# Patient Record
Sex: MALE | Race: WHITE | NOT HISPANIC OR LATINO | URBAN - METROPOLITAN AREA
[De-identification: names, ages, dates, MRNs, and addresses within clinical notes are randomized per-mention and may not be internally consistent; named-entity substitution may affect disease eponyms.]

---

## 2020-07-25 ENCOUNTER — TELEPHONE ENCOUNTER (OUTPATIENT)
Dept: URBAN - METROPOLITAN AREA CLINIC 13 | Facility: CLINIC | Age: 70
End: 2020-07-25

## 2020-07-25 RX ORDER — ATORVASTATIN CALCIUM 40 MG/1
TABLET, FILM COATED ORAL
Qty: 90 | Refills: 0 | OUTPATIENT
Start: 2012-01-27 | End: 2013-01-10

## 2020-07-26 ENCOUNTER — TELEPHONE ENCOUNTER (OUTPATIENT)
Dept: URBAN - METROPOLITAN AREA CLINIC 13 | Facility: CLINIC | Age: 70
End: 2020-07-26

## 2020-07-26 RX ORDER — CITALOPRAM HYDROBROMIDE 10 MG/1
TAKE ONE TABLET BY MOUTH EVERY DAY TABLET, FILM COATED ORAL
Qty: 90 | Refills: 0 | Status: ACTIVE | COMMUNITY
Start: 2012-02-20

## 2020-07-26 RX ORDER — ARMODAFINIL 250 MG/1
TABLET ORAL
Qty: 90 | Refills: 0 | Status: ACTIVE | COMMUNITY
Start: 2013-01-08

## 2020-07-26 RX ORDER — MULTIVITAMIN
TAKE 1 CAPSULE DAILY CAPSULE ORAL
Refills: 0 | Status: ACTIVE | COMMUNITY

## 2020-07-26 RX ORDER — AMLODIPINE BESYLATE 10 MG/1
TAKE 1 TABLET DAILY TABLET ORAL
Refills: 0 | Status: ACTIVE | COMMUNITY
Start: 2012-01-27

## 2020-07-26 RX ORDER — CITALOPRAM HYDROBROMIDE 10 MG/1
TABLET, FILM COATED ORAL
Qty: 90 | Refills: 0 | Status: ACTIVE | COMMUNITY
Start: 2011-12-05

## 2020-07-26 RX ORDER — ZOLPIDEM TARTRATE 1.75 MG/1
PLACE 1 MG DAILY TABLET SUBLINGUAL
Refills: 0 | Status: ACTIVE | COMMUNITY
Start: 2012-10-22

## 2020-07-26 RX ORDER — CEPHALEXIN 250 MG/1
CAPSULE ORAL
Qty: 10 | Refills: 0 | Status: ACTIVE | COMMUNITY
Start: 2011-05-10

## 2020-07-26 RX ORDER — HYDROXYZINE PAMOATE 25 MG/1
CAPSULE ORAL
Qty: 30 | Refills: 0 | Status: ACTIVE | COMMUNITY
Start: 2012-02-20

## 2020-07-26 RX ORDER — HYDROXYZINE PAMOATE 25 MG/1
TAKE 1 CAPSULE 4 TIMES DAILY AS NEEDED CAPSULE ORAL
Qty: 360 | Refills: 3 | Status: ACTIVE | COMMUNITY
Start: 2012-11-01

## 2020-07-26 RX ORDER — CLONAZEPAM 1 MG/1
TAKE 1 TABLET 3 TIMES DAILY TABLET ORAL
Refills: 0 | Status: ACTIVE | COMMUNITY
Start: 2012-10-24

## 2021-02-17 ENCOUNTER — OFFICE VISIT (OUTPATIENT)
Dept: URBAN - METROPOLITAN AREA CLINIC 113 | Facility: CLINIC | Age: 71
End: 2021-02-17
Payer: MEDICARE

## 2021-02-17 VITALS
DIASTOLIC BLOOD PRESSURE: 82 MMHG | HEART RATE: 87 BPM | SYSTOLIC BLOOD PRESSURE: 154 MMHG | BODY MASS INDEX: 27.47 KG/M2 | WEIGHT: 175 LBS | TEMPERATURE: 97.7 F | HEIGHT: 67 IN | RESPIRATION RATE: 18 BRPM

## 2021-02-17 DIAGNOSIS — K76.0 FATTY LIVER: ICD-10-CM

## 2021-02-17 DIAGNOSIS — Z12.11 COLON CANCER SCREENING: ICD-10-CM

## 2021-02-17 DIAGNOSIS — R74.8 ELEVATED LIVER ENZYMES: ICD-10-CM

## 2021-02-17 PROBLEM — 305058001: Status: ACTIVE | Noted: 2021-02-17

## 2021-02-17 PROCEDURE — 99214 OFFICE O/P EST MOD 30 MIN: CPT | Performed by: NURSE PRACTITIONER

## 2021-02-17 RX ORDER — SODIUM, POTASSIUM,MAG SULFATES 17.5-3.13G
354 ML SOLUTION, RECONSTITUTED, ORAL ORAL ONCE
Qty: 354 MILLILITER | Refills: 0 | OUTPATIENT

## 2021-02-17 RX ORDER — MELOXICAM 15 MG/1
1 TABLET TABLET ORAL ONCE A DAY
Status: ACTIVE | COMMUNITY

## 2021-02-17 RX ORDER — CEPHALEXIN 250 MG/1
CAPSULE ORAL
Qty: 10 | Refills: 0 | Status: DISCONTINUED | COMMUNITY
Start: 2011-05-10

## 2021-02-17 RX ORDER — LISINOPRIL AND HYDROCHLOROTHIAZIDE 10; 12.5 MG/1; MG/1
1 TABLET TABLET ORAL ONCE A DAY
Status: ACTIVE | COMMUNITY

## 2021-02-17 RX ORDER — ARMODAFINIL 250 MG/1
1 TABLET TABLET ORAL ONCE A DAY
Refills: 0 | Status: DISCONTINUED | COMMUNITY
Start: 2013-01-08

## 2021-02-17 RX ORDER — MULTIVITAMIN
TAKE 1 CAPSULE DAILY CAPSULE ORAL
Refills: 0 | Status: ACTIVE | COMMUNITY

## 2021-02-17 RX ORDER — ZOLPIDEM TARTRATE 12.5 MG/1
1 TABLET AT BEDTIME AS NEEDED TABLET, EXTENDED RELEASE ORAL ONCE A DAY
Status: ACTIVE | COMMUNITY

## 2021-02-17 RX ORDER — ZOLPIDEM TARTRATE 1.75 MG/1
PLACE 1 MG DAILY TABLET SUBLINGUAL
Refills: 0 | Status: DISCONTINUED | COMMUNITY
Start: 2012-10-22

## 2021-02-17 RX ORDER — CLONAZEPAM 1 MG/1
TAKE 1 TABLET 3 TIMES DAILY TABLET ORAL
Refills: 0 | Status: DISCONTINUED | COMMUNITY
Start: 2012-10-24

## 2021-02-17 RX ORDER — HYDROXYZINE PAMOATE 25 MG/1
1 CAPSULE AS NEEDED CAPSULE ORAL
Refills: 0 | Status: DISCONTINUED | COMMUNITY
Start: 2012-02-20

## 2021-02-17 RX ORDER — CITALOPRAM HYDROBROMIDE 10 MG/1
TABLET, FILM COATED ORAL
Qty: 90 | Refills: 0 | Status: DISCONTINUED | COMMUNITY
Start: 2011-12-05

## 2021-02-17 RX ORDER — AMLODIPINE BESYLATE 10 MG/1
TAKE 1 TABLET DAILY TABLET ORAL
Refills: 0 | Status: DISCONTINUED | COMMUNITY
Start: 2012-01-27

## 2021-02-17 RX ORDER — OMEPRAZOLE 40 MG/1
1 CAPSULE 30 MINUTES BEFORE MORNING MEAL CAPSULE, DELAYED RELEASE ORAL ONCE A DAY
Status: ACTIVE | COMMUNITY

## 2021-02-17 NOTE — HPI-TODAY'S VISIT:
This is a 70-year-old male with a history of elevated liver enzymes related to fatty liver and alcohol use presenting after a long interval regarding liver enzyme elevation.  He was last seen in 2013  For follow-up regarding liver enzyme elevation.  A work-up for autoimmune, viral, and hereditary sources of liver disease was negative.  He reported a 40-year history of alcoholism.  He had stopped drinking alcohol in October 2012.  Liver enzymes were normal.  He was instructed to continue to avoid alcohol. He has moved to [place], [place].  He has maintained his relationships with his physicians in [place] with the exception of primary care.  10 months ago, he started drinking alcohol.  Initially, he was drinking 2/day.  This escalated to 6 beverages per day typically consisting of 3 beers and 2 shots of tequila.  He had labs in January that demonstrated liver enzyme elevation and stopped drinking alcohol.  Acid reflux is controlled with omeprazole.  He denies any other abdominal symptoms.  He denies use of herbal preparations.  He is due for screening colonoscopy. His PCP is JUAN M Conte at [facility] in [place], [place] He will obtain labs at [facility] in [place].

## 2021-02-22 ENCOUNTER — LAB OUTSIDE AN ENCOUNTER (OUTPATIENT)
Dept: URBAN - METROPOLITAN AREA CLINIC 113 | Facility: CLINIC | Age: 71
End: 2021-02-22

## 2021-02-26 ENCOUNTER — TELEPHONE ENCOUNTER (OUTPATIENT)
Dept: URBAN - METROPOLITAN AREA SURGERY CENTER 30 | Facility: SURGERY CENTER | Age: 71
End: 2021-02-26

## 2021-02-26 RX ORDER — MELOXICAM 15 MG/1
1 TABLET TABLET ORAL ONCE A DAY
Status: ACTIVE | COMMUNITY

## 2021-02-26 RX ORDER — SODIUM, POTASSIUM,MAG SULFATES 17.5-3.13G
354 ML SOLUTION, RECONSTITUTED, ORAL ORAL ONCE
Qty: 354 MILLILITER | Refills: 0 | Status: ACTIVE | COMMUNITY

## 2021-02-26 RX ORDER — LISINOPRIL AND HYDROCHLOROTHIAZIDE 10; 12.5 MG/1; MG/1
1 TABLET TABLET ORAL ONCE A DAY
Status: ACTIVE | COMMUNITY

## 2021-02-26 RX ORDER — MULTIVITAMIN
TAKE 1 CAPSULE DAILY CAPSULE ORAL
Refills: 0 | Status: ACTIVE | COMMUNITY

## 2021-02-26 RX ORDER — ZOLPIDEM TARTRATE 12.5 MG/1
1 TABLET AT BEDTIME AS NEEDED TABLET, EXTENDED RELEASE ORAL ONCE A DAY
Status: ACTIVE | COMMUNITY

## 2021-02-26 RX ORDER — OMEPRAZOLE 40 MG/1
1 CAPSULE 30 MINUTES BEFORE MORNING MEAL CAPSULE, DELAYED RELEASE ORAL ONCE A DAY
Status: ACTIVE | COMMUNITY

## 2021-02-26 RX ORDER — POLYETHYLENE GLYCOL 3350, SODIUM SULFATE, SODIUM CHLORIDE, POTASSIUM CHLORIDE, ASCORBIC ACID, SODIUM ASCORBATE 140-9-5.2G
AS DIRECTED KIT ORAL ONCE
Qty: 140 GM | Refills: 0 | OUTPATIENT
Start: 2021-02-26 | End: 2021-02-27

## 2021-03-03 ENCOUNTER — TELEPHONE ENCOUNTER (OUTPATIENT)
Dept: URBAN - METROPOLITAN AREA CLINIC 113 | Facility: CLINIC | Age: 71
End: 2021-03-03

## 2021-03-03 RX ORDER — OMEPRAZOLE 40 MG/1
1 CAPSULE 30 MINUTES BEFORE MORNING MEAL CAPSULE, DELAYED RELEASE ORAL ONCE A DAY
Status: ACTIVE | COMMUNITY

## 2021-03-03 RX ORDER — MELOXICAM 15 MG/1
1 TABLET TABLET ORAL ONCE A DAY
Status: ACTIVE | COMMUNITY

## 2021-03-03 RX ORDER — ZOLPIDEM TARTRATE 12.5 MG/1
1 TABLET AT BEDTIME AS NEEDED TABLET, EXTENDED RELEASE ORAL ONCE A DAY
Status: ACTIVE | COMMUNITY

## 2021-03-03 RX ORDER — SODIUM, POTASSIUM,MAG SULFATES 17.5-3.13G
354 ML SOLUTION, RECONSTITUTED, ORAL ORAL ONCE
Qty: 354 MILLILITER | Refills: 0 | Status: ACTIVE | COMMUNITY

## 2021-03-03 RX ORDER — LISINOPRIL AND HYDROCHLOROTHIAZIDE 10; 12.5 MG/1; MG/1
1 TABLET TABLET ORAL ONCE A DAY
Status: ACTIVE | COMMUNITY

## 2021-03-03 RX ORDER — SODIUM PICOSULFATE, MAGNESIUM OXIDE, AND ANHYDROUS CITRIC ACID 10; 3.5; 12 MG/160ML; G/160ML; G/160ML
160ML LIQUID ORAL ONCE
Qty: 160 ML | Refills: 0 | OUTPATIENT
Start: 2021-03-03 | End: 2021-03-04

## 2021-03-03 RX ORDER — MULTIVITAMIN
TAKE 1 CAPSULE DAILY CAPSULE ORAL
Refills: 0 | Status: ACTIVE | COMMUNITY

## 2021-03-09 ENCOUNTER — CLAIMS CREATED FROM THE CLAIM WINDOW (OUTPATIENT)
Dept: URBAN - METROPOLITAN AREA CLINIC 4 | Facility: CLINIC | Age: 71
End: 2021-03-09
Payer: MEDICARE

## 2021-03-09 ENCOUNTER — OFFICE VISIT (OUTPATIENT)
Dept: URBAN - METROPOLITAN AREA SURGERY CENTER 25 | Facility: SURGERY CENTER | Age: 71
End: 2021-03-09
Payer: MEDICARE

## 2021-03-09 DIAGNOSIS — D12.5 ADENOMA OF SIGMOID COLON: ICD-10-CM

## 2021-03-09 DIAGNOSIS — D12.5 BENIGN NEOPLASM OF SIGMOID COLON: ICD-10-CM

## 2021-03-09 DIAGNOSIS — Z12.11 COLON CANCER SCREENING: ICD-10-CM

## 2021-03-09 DIAGNOSIS — D12.2 ADENOMA OF ASCENDING COLON: ICD-10-CM

## 2021-03-09 DIAGNOSIS — D12.2 BENIGN NEOPLASM OF ASCENDING COLON: ICD-10-CM

## 2021-03-09 PROCEDURE — 45385 COLONOSCOPY W/LESION REMOVAL: CPT | Performed by: INTERNAL MEDICINE

## 2021-03-09 PROCEDURE — G8907 PT DOC NO EVENTS ON DISCHARG: HCPCS | Performed by: INTERNAL MEDICINE

## 2021-03-09 PROCEDURE — 88305 TISSUE EXAM BY PATHOLOGIST: CPT | Performed by: PATHOLOGY

## 2021-03-09 RX ORDER — MELOXICAM 15 MG/1
1 TABLET TABLET ORAL ONCE A DAY
Status: ACTIVE | COMMUNITY

## 2021-03-09 RX ORDER — MULTIVITAMIN
TAKE 1 CAPSULE DAILY CAPSULE ORAL
Refills: 0 | Status: ACTIVE | COMMUNITY

## 2021-03-09 RX ORDER — ZOLPIDEM TARTRATE 12.5 MG/1
1 TABLET AT BEDTIME AS NEEDED TABLET, EXTENDED RELEASE ORAL ONCE A DAY
Status: ACTIVE | COMMUNITY

## 2021-03-09 RX ORDER — OMEPRAZOLE 40 MG/1
1 CAPSULE 30 MINUTES BEFORE MORNING MEAL CAPSULE, DELAYED RELEASE ORAL ONCE A DAY
Status: ACTIVE | COMMUNITY

## 2021-03-09 RX ORDER — LISINOPRIL AND HYDROCHLOROTHIAZIDE 10; 12.5 MG/1; MG/1
1 TABLET TABLET ORAL ONCE A DAY
Status: ACTIVE | COMMUNITY

## 2021-03-09 RX ORDER — SODIUM, POTASSIUM,MAG SULFATES 17.5-3.13G
354 ML SOLUTION, RECONSTITUTED, ORAL ORAL ONCE
Qty: 354 MILLILITER | Refills: 0 | Status: ACTIVE | COMMUNITY

## 2021-03-10 ENCOUNTER — TELEPHONE ENCOUNTER (OUTPATIENT)
Dept: URBAN - METROPOLITAN AREA CLINIC 113 | Facility: CLINIC | Age: 71
End: 2021-03-10

## 2021-04-08 ENCOUNTER — OFFICE VISIT (OUTPATIENT)
Dept: URBAN - METROPOLITAN AREA CLINIC 107 | Facility: CLINIC | Age: 71
End: 2021-04-08
Payer: MEDICARE

## 2021-04-08 ENCOUNTER — WEB ENCOUNTER (OUTPATIENT)
Dept: URBAN - METROPOLITAN AREA CLINIC 107 | Facility: CLINIC | Age: 71
End: 2021-04-08

## 2021-04-08 VITALS
WEIGHT: 174 LBS | SYSTOLIC BLOOD PRESSURE: 166 MMHG | TEMPERATURE: 98.5 F | BODY MASS INDEX: 27.31 KG/M2 | HEIGHT: 67 IN | RESPIRATION RATE: 18 BRPM | HEART RATE: 84 BPM | DIASTOLIC BLOOD PRESSURE: 83 MMHG

## 2021-04-08 DIAGNOSIS — K57.30 COLON, DIVERTICULOSIS: ICD-10-CM

## 2021-04-08 DIAGNOSIS — K76.0 FATTY LIVER: ICD-10-CM

## 2021-04-08 DIAGNOSIS — R74.8 ELEVATED LIVER ENZYMES: ICD-10-CM

## 2021-04-08 DIAGNOSIS — Z86.010 HISTORY OF ADENOMATOUS POLYP OF COLON: ICD-10-CM

## 2021-04-08 PROBLEM — 733657002: Status: ACTIVE | Noted: 2021-04-08

## 2021-04-08 PROCEDURE — 99213 OFFICE O/P EST LOW 20 MIN: CPT | Performed by: NURSE PRACTITIONER

## 2021-04-08 RX ORDER — ZOLPIDEM TARTRATE 12.5 MG/1
1 TABLET AT BEDTIME AS NEEDED TABLET, EXTENDED RELEASE ORAL ONCE A DAY
Status: ACTIVE | COMMUNITY

## 2021-04-08 RX ORDER — OMEPRAZOLE 40 MG/1
1 CAPSULE 30 MINUTES BEFORE MORNING MEAL CAPSULE, DELAYED RELEASE ORAL ONCE A DAY
Status: ACTIVE | COMMUNITY

## 2021-04-08 RX ORDER — SODIUM, POTASSIUM,MAG SULFATES 17.5-3.13G
354 ML SOLUTION, RECONSTITUTED, ORAL ORAL ONCE
Qty: 354 MILLILITER | Refills: 0 | Status: ON HOLD | COMMUNITY

## 2021-04-08 RX ORDER — MELOXICAM 15 MG/1
1 TABLET TABLET ORAL ONCE A DAY
Status: ON HOLD | COMMUNITY

## 2021-04-08 RX ORDER — MULTIVITAMIN
TAKE 1 CAPSULE DAILY CAPSULE ORAL
Refills: 0 | Status: ACTIVE | COMMUNITY

## 2021-04-08 RX ORDER — LISINOPRIL AND HYDROCHLOROTHIAZIDE 10; 12.5 MG/1; MG/1
1 TABLET TABLET ORAL ONCE A DAY
Status: ACTIVE | COMMUNITY

## 2021-04-08 NOTE — HPI-OTHER HISTORIES
Colonoscopy 3/9/2021: BBPS 9, diverticulosis from the anus to the transverse colon, removal of 2 sigmoid and descending 4 to 5 mm tubular adenomas.  Surveillance recommended in 2026.   Labs 2/18/2021: CBC: WBC 7.2, hemoglobin 15.5, MCV 90, platelet 276.  BMP normal with exception of sodium 133, chloride 94, glucose 120.  LFTs: TB 0.6, ALP 86, ALT 79, AST 57.

## 2021-04-08 NOTE — HPI-OTHER HISTORIES
Labs : 1/4/2021 BMP normal with exception of glucose 119.  LFTs: TB 0.5, ALP 88, , .  Cholesterol 272, triglycerides 261, HDL 35, .  Hemoglobin A1c 5.8.  PSA 0.6.  Microalbumin normal.

## 2021-04-08 NOTE — HPI-TODAY'S VISIT:
This is a 71-year-old male with a history of elevated liver enzymes related to fatty liver and alcohol use presenting for follow-up.  He was last seen 2/17/2021 after a long interval.  He had moved to [facility] and had not been seen since 2013.  He had stopped drinking alcohol for 8 or 9 years and had restarted alcohol 10 months prior to his visit.  Liver enzymes had previously normalized but had elevated in January   And he stopped drinking alcohol.  GERD was controlled with omeprazole.  Labs were ordered and he was instructed to continue to abstain from alcohol.  He was scheduled for a screening colonoscopy. He denies any abdominal symptoms.  He continues to abstain from alcohol.

## 2021-10-08 ENCOUNTER — OFFICE VISIT (OUTPATIENT)
Dept: URBAN - METROPOLITAN AREA CLINIC 107 | Facility: CLINIC | Age: 71
End: 2021-10-08

## 2021-10-19 ENCOUNTER — OFFICE VISIT (OUTPATIENT)
Dept: URBAN - METROPOLITAN AREA CLINIC 107 | Facility: CLINIC | Age: 71
End: 2021-10-19

## 2021-10-19 ENCOUNTER — OFFICE VISIT (OUTPATIENT)
Dept: URBAN - METROPOLITAN AREA CLINIC 107 | Facility: CLINIC | Age: 71
End: 2021-10-19
Payer: MEDICARE

## 2021-10-19 VITALS
RESPIRATION RATE: 18 BRPM | HEIGHT: 67 IN | HEART RATE: 91 BPM | BODY MASS INDEX: 28.72 KG/M2 | TEMPERATURE: 98.1 F | WEIGHT: 183 LBS | DIASTOLIC BLOOD PRESSURE: 85 MMHG | SYSTOLIC BLOOD PRESSURE: 149 MMHG

## 2021-10-19 DIAGNOSIS — R63.5 WEIGHT GAIN: ICD-10-CM

## 2021-10-19 DIAGNOSIS — K21.9 GASTROESOPHAGEAL REFLUX DISEASE, UNSPECIFIED WHETHER ESOPHAGITIS PRESENT: ICD-10-CM

## 2021-10-19 DIAGNOSIS — K76.0 FATTY LIVER: ICD-10-CM

## 2021-10-19 DIAGNOSIS — R74.8 ELEVATED LIVER ENZYMES: ICD-10-CM

## 2021-10-19 PROCEDURE — 99214 OFFICE O/P EST MOD 30 MIN: CPT | Performed by: NURSE PRACTITIONER

## 2021-10-19 RX ORDER — MELOXICAM 15 MG/1
1 TABLET TABLET ORAL ONCE A DAY
Status: ON HOLD | COMMUNITY

## 2021-10-19 RX ORDER — BUSPIRONE HYDROCHLORIDE 10 MG/1
1 TABLET TABLET ORAL TWICE A DAY
Status: ACTIVE | COMMUNITY

## 2021-10-19 RX ORDER — SODIUM, POTASSIUM,MAG SULFATES 17.5-3.13G
354 ML SOLUTION, RECONSTITUTED, ORAL ORAL ONCE
Qty: 354 MILLILITER | Refills: 0 | Status: ON HOLD | COMMUNITY

## 2021-10-19 RX ORDER — ZOLPIDEM TARTRATE 12.5 MG/1
1 TABLET AT BEDTIME AS NEEDED TABLET, EXTENDED RELEASE ORAL ONCE A DAY
Status: ON HOLD | COMMUNITY

## 2021-10-19 RX ORDER — OMEPRAZOLE 40 MG/1
1 CAPSULE 30 MINUTES BEFORE MORNING MEAL CAPSULE, DELAYED RELEASE ORAL ONCE A DAY
Status: ACTIVE | COMMUNITY

## 2021-10-19 RX ORDER — MULTIVITAMIN
TAKE 1 CAPSULE DAILY CAPSULE ORAL
Refills: 0 | Status: ACTIVE | COMMUNITY

## 2021-10-19 RX ORDER — LISINOPRIL AND HYDROCHLOROTHIAZIDE 10; 12.5 MG/1; MG/1
1 TABLET TABLET ORAL ONCE A DAY
Status: ACTIVE | COMMUNITY

## 2021-10-19 NOTE — HPI-TODAY'S VISIT:
This is a 71-year-old male with a history of elevated liver enzymes related to fatty liver and alcohol use, adenomatous colon polyps due surveillance in March 2026 presenting for follow-up. He was last seen 4/8/2021.  Labs have improved since he abstain from alcohol.  He was instructed to continue abstinence.  Dr. Cazares recommended alpha-fetoprotein and abdominal ultrasound to assess liver parenchyma and screen for cancer.  He was instructed to begin daily fiber for diverticulosis. He is also taking milk thistle and zinc daily.  He denies new prescriptions.  He is not taking medication for hypercholesterolemia due to an allergy to statins.  Diabetes is managed with diet.  He reports his last A1c was 5.8 and his blood glucose levels are around 120.  He takes an over-the-counter antihistamine/decongestant occasionally and also requires ibuprofen 200 mg 3 or 4 times a week.  Labs to assess for chronic sources of liver disease were obtained in 2012 and were unremarkable.

## 2021-10-19 NOTE — HPI-OTHER HISTORIES
Labs :  10/8/2021 BMP normal with exception of glucose 125, creatinine 0.64.  LFTs: TB 0.5, ALP 82, , AST 81.  CBC: WBC 7.2, hemoglobin 14.6, MCV 91.3, platelet 289.  Lipid panel normal with the exception of triglycerides 228, cholesterol 219, HDL 33, .  PSA 0.92.  T3 uptake 26, T4 total 7.9, free T4 index 2.1, TSH 1.74.  Hemoglobin A1c 5.8.  Urine microalbumin normal. 5/19/2021:Alpha-fetoprotein 3.4. 2/18/2021: CBC: WBC 7.2, hemoglobin 15.5, MCV 90, platelet 276.  BMP normal with exception of sodium 133, chloride 94, glucose 120.  LFTs: TB 0.6, ALP 86, ALT 79, AST 57. 1/4/2021: BMP normal with exception of glucose 119.  LFTs: TB 0.5, ALP 88, , .  Cholesterol 272, triglycerides 261, HDL 35, .  Hemoglobin A1c 5.8.  PSA 0.6.  Microalbumin normal. 5/22/12:  , AST 36, ALT 34,, AP 96, T. Bili 0.4, other lab tests all normal, including all hepatitis serologies, TSH (1.31), PT/INR, albumin, AFP, ferritin (106), iron asaturation (28%), AMA, KRISHNA, ASMA, alpha-1-antitrypsin (133), LKM1, ceruloplasmin (27).     Abdominal ultrasound 5/14/2021: No acute process.  The liver is hyperechoic suggesting steatosis.  12 mm left renal lesion consistent with a cyst. Colonoscopy 3/9/2021: BBPS 9, diverticulosis from the anus to the transverse colon, removal of 2 sigmoid and descending 4 to 5 mm tubular adenomas.

## 2021-10-23 ENCOUNTER — TELEPHONE ENCOUNTER (OUTPATIENT)
Dept: URBAN - METROPOLITAN AREA CLINIC 113 | Facility: CLINIC | Age: 71
End: 2021-10-23

## 2022-03-31 ENCOUNTER — OFFICE VISIT (OUTPATIENT)
Dept: URBAN - METROPOLITAN AREA CLINIC 107 | Facility: CLINIC | Age: 72
End: 2022-03-31
Payer: MEDICARE

## 2022-03-31 VITALS
WEIGHT: 186 LBS | RESPIRATION RATE: 18 BRPM | TEMPERATURE: 98.4 F | HEIGHT: 67 IN | BODY MASS INDEX: 29.19 KG/M2 | DIASTOLIC BLOOD PRESSURE: 88 MMHG | SYSTOLIC BLOOD PRESSURE: 161 MMHG | HEART RATE: 108 BPM

## 2022-03-31 DIAGNOSIS — R63.5 WEIGHT GAIN: ICD-10-CM

## 2022-03-31 DIAGNOSIS — R74.8 ELEVATED LIVER ENZYMES: ICD-10-CM

## 2022-03-31 DIAGNOSIS — K21.9 GASTROESOPHAGEAL REFLUX DISEASE, UNSPECIFIED WHETHER ESOPHAGITIS PRESENT: ICD-10-CM

## 2022-03-31 DIAGNOSIS — Z86.010 HISTORY OF ADENOMATOUS POLYP OF COLON: ICD-10-CM

## 2022-03-31 DIAGNOSIS — K76.0 FATTY LIVER: ICD-10-CM

## 2022-03-31 PROCEDURE — 99213 OFFICE O/P EST LOW 20 MIN: CPT | Performed by: INTERNAL MEDICINE

## 2022-03-31 RX ORDER — MULTIVITAMIN
TAKE 1 CAPSULE DAILY CAPSULE ORAL
Refills: 0 | Status: ACTIVE | COMMUNITY

## 2022-03-31 RX ORDER — LISINOPRIL AND HYDROCHLOROTHIAZIDE 20; 12.5 MG/1; MG/1
1 TABLET TABLET ORAL ONCE A DAY
Status: ACTIVE | COMMUNITY

## 2022-03-31 RX ORDER — MELOXICAM 15 MG/1
1 TABLET TABLET ORAL ONCE A DAY
Status: ON HOLD | COMMUNITY

## 2022-03-31 RX ORDER — OMEPRAZOLE 40 MG/1
1 CAPSULE 30 MINUTES BEFORE MORNING MEAL CAPSULE, DELAYED RELEASE ORAL ONCE A DAY
Status: ACTIVE | COMMUNITY

## 2022-03-31 RX ORDER — ZOLPIDEM TARTRATE 12.5 MG/1
1 TABLET AT BEDTIME AS NEEDED TABLET, EXTENDED RELEASE ORAL ONCE A DAY
Status: ON HOLD | COMMUNITY

## 2022-03-31 RX ORDER — SODIUM, POTASSIUM,MAG SULFATES 17.5-3.13G
354 ML SOLUTION, RECONSTITUTED, ORAL ORAL ONCE
Qty: 354 MILLILITER | Refills: 0 | Status: ON HOLD | COMMUNITY

## 2022-03-31 RX ORDER — BUSPIRONE HYDROCHLORIDE 10 MG/1
1 TABLET TABLET ORAL ONCE A DAY
Status: ACTIVE | COMMUNITY

## 2022-03-31 NOTE — HPI-TODAY'S VISIT:
This is a 72-year-old male with a history of elevated liver enzymes related to fatty liver and alcohol use, adenomatous colon polyps due surveillance in March 2026 presenting for follow-up. He was last seen on 10/19/21.  He was previously seen 4/8/2021.  Labs had improved since he began to abstain from alcohol, and he was instructed to continue abstinence.  We recommended alpha-fetoprotein and abdominal ultrasound to assess liver parenchyma and screen for cancer.  He was instructed to begin daily fiber for diverticulosis. He was also taking milk thistle and zinc daily.  He denied new prescriptions.  He was not taking medication for hypercholesterolemia due to an allergy to statins.  Diabetes is managed with diet.  He reported his last A1c was 5.8 and his blood glucose levels were around 120.  He takes an over-the-counter antihistamine/decongestant occasionally and also requires ibuprofen 200 mg 3 or 4 times a week.  Labs to assess for chronic sources of liver disease were obtained in 2012 and were unremarkable.   We have labs from 3/10/22 showing a normal CBC, AST 64, ALT 88, Total bilirubin 0.6, albumin 3.8, and a normal BMP.  The patient continues to abstain from alcohol. His reflux is controlled with Omeprazole.  He has no specific GI complaints today.

## 2022-04-09 PROBLEM — 707724006: Status: ACTIVE | Noted: 2021-02-17

## 2022-04-09 PROBLEM — 429047008: Status: ACTIVE | Noted: 2021-04-08

## 2022-04-09 PROBLEM — 235595009: Status: ACTIVE | Noted: 2021-10-19

## 2022-04-09 PROBLEM — 197321007: Status: ACTIVE | Noted: 2021-02-17

## 2022-07-21 ENCOUNTER — TELEPHONE ENCOUNTER (OUTPATIENT)
Dept: URBAN - METROPOLITAN AREA CLINIC 107 | Facility: CLINIC | Age: 72
End: 2022-07-21

## 2023-05-30 ENCOUNTER — OFFICE VISIT (OUTPATIENT)
Dept: URBAN - METROPOLITAN AREA CLINIC 107 | Facility: CLINIC | Age: 73
End: 2023-05-30
Payer: MEDICARE

## 2023-05-30 VITALS
WEIGHT: 181.8 LBS | HEIGHT: 67 IN | DIASTOLIC BLOOD PRESSURE: 77 MMHG | HEART RATE: 101 BPM | SYSTOLIC BLOOD PRESSURE: 135 MMHG | BODY MASS INDEX: 28.53 KG/M2 | TEMPERATURE: 98.3 F

## 2023-05-30 DIAGNOSIS — K76.0 FATTY LIVER: ICD-10-CM

## 2023-05-30 DIAGNOSIS — K21.9 GASTROESOPHAGEAL REFLUX DISEASE WITHOUT ESOPHAGITIS: ICD-10-CM

## 2023-05-30 PROCEDURE — 99213 OFFICE O/P EST LOW 20 MIN: CPT | Performed by: NURSE PRACTITIONER

## 2023-05-30 RX ORDER — BUPROPION HYDROCHLORIDE 100 MG/1
1 TABLET TABLET, FILM COATED ORAL ONCE A DAY
Status: ACTIVE | COMMUNITY

## 2023-05-30 RX ORDER — OMEPRAZOLE 40 MG/1
1 CAPSULE 30 MINUTES BEFORE MORNING MEAL CAPSULE, DELAYED RELEASE ORAL ONCE A DAY
Status: ACTIVE | COMMUNITY

## 2023-05-30 RX ORDER — ZOLPIDEM TARTRATE 12.5 MG/1
1 TABLET AT BEDTIME AS NEEDED TABLET, EXTENDED RELEASE ORAL ONCE A DAY
Status: ON HOLD | COMMUNITY

## 2023-05-30 RX ORDER — ZOLPIDEM TARTRATE SUBLINGUAL 3.5 MG/1
1 TABLET UNDER THE TONGUE AND ALLOW TO DISSOLVE AFTER WAKING IN THE MIDDLE OF THE NIGHT AS NEEDED TABLET SUBLINGUAL ONCE A DAY
Status: ACTIVE | COMMUNITY

## 2023-05-30 RX ORDER — SODIUM, POTASSIUM,MAG SULFATES 17.5-3.13G
354 ML SOLUTION, RECONSTITUTED, ORAL ORAL ONCE
Qty: 354 MILLILITER | Refills: 0 | Status: ON HOLD | COMMUNITY

## 2023-05-30 RX ORDER — MELOXICAM 15 MG/1
1 TABLET TABLET ORAL ONCE A DAY
Status: ON HOLD | COMMUNITY

## 2023-05-30 RX ORDER — TAMSULOSIN HYDROCHLORIDE 0.4 MG/1
1 CAPSULE CAPSULE ORAL ONCE A DAY
Status: ACTIVE | COMMUNITY

## 2023-05-30 RX ORDER — LISINOPRIL AND HYDROCHLOROTHIAZIDE 20; 12.5 MG/1; MG/1
1 TABLET TABLET ORAL ONCE A DAY
Status: ACTIVE | COMMUNITY

## 2023-05-30 RX ORDER — MULTIVITAMIN
TAKE 1 CAPSULE DAILY CAPSULE ORAL
Refills: 0 | Status: ACTIVE | COMMUNITY

## 2023-05-30 RX ORDER — BUSPIRONE HYDROCHLORIDE 10 MG/1
1 TABLET TABLET ORAL ONCE A DAY
Status: ON HOLD | COMMUNITY

## 2023-05-30 NOTE — HPI-TODAY'S VISIT:
This is a 73-year-old male with a history of elevated liver enzymes with fatty liver and alcohol use, adenomatous colon polyps due surveillance in March 2026, and GERD presenting for follow-up. He was last seen 3/31/2022.  Labs demonstrated elevated liver enzymes likely associated with fatty liver noted on ultrasound.  Labs to assess for chronic sources of liver disease were negative in 2013.  Efforts at weight reduction were recommended.  He reported he was unable to exercise due to musculoskeletal problems.  It was recommended that he reduce caloric intake and continue to avoid alcohol and discuss cholesterol treatment with his primary care provider.  He was to continue to control glucose levels.  Acid reflux was controlled with omeprazole 40 mg daily which he was to continue. He called our office in July reporting that his physician wanted to prescribe Zetia.  Dr. Cazares recommended that his primary care physician follow his liver enzymes closely checking in 1 month and every 3 months thereafter for a while.  These recommendations were communicated to the patient. He reports an increase in liver enzymes on Zetia.  This has improved off medication.  He had labs in October.  He reports improved liver enzymes. He is taking omeprazole 40 mg daily.  His symptoms of acid reflux are controlled on medication.   He reports 2 episodes of pain indicated in the left lower quadrant lasting 3 to 4 days since his last visit.  He was evaluated with a CT scan at [facility] in [place] in March.  He reports CT findings were negative for diverticulitis.  However, he has been prescribed 2 antibiotics to start in the event this recurs.  He reports a finding of enlarged prostate for which he took Flomax for 90 days.  He has not required antibiotics.   He was experiencing diarrhea.  He determined this was associated with eating a rodrigues egg sandwich.  He has determined that eggs are a problem.  He has stopped eating them and his bowel habits have returned to normal.  He denies any other abdominal symptoms.  He denies alcohol use.

## 2023-06-03 ENCOUNTER — DASHBOARD ENCOUNTERS (OUTPATIENT)
Age: 73
End: 2023-06-03

## 2023-06-03 PROBLEM — 266435005: Status: ACTIVE | Noted: 2023-06-03

## 2024-06-11 ENCOUNTER — OFFICE VISIT (OUTPATIENT)
Dept: URBAN - METROPOLITAN AREA CLINIC 107 | Facility: CLINIC | Age: 74
End: 2024-06-11
Payer: MEDICARE

## 2024-06-11 VITALS
BODY MASS INDEX: 27.5 KG/M2 | TEMPERATURE: 98.1 F | DIASTOLIC BLOOD PRESSURE: 76 MMHG | HEART RATE: 90 BPM | WEIGHT: 175.2 LBS | HEIGHT: 67 IN | SYSTOLIC BLOOD PRESSURE: 143 MMHG

## 2024-06-11 DIAGNOSIS — K76.0 FATTY LIVER: ICD-10-CM

## 2024-06-11 DIAGNOSIS — R74.8 ELEVATED LIVER ENZYMES: ICD-10-CM

## 2024-06-11 DIAGNOSIS — K21.9 GASTROESOPHAGEAL REFLUX DISEASE, UNSPECIFIED WHETHER ESOPHAGITIS PRESENT: ICD-10-CM

## 2024-06-11 PROCEDURE — 99214 OFFICE O/P EST MOD 30 MIN: CPT | Performed by: NURSE PRACTITIONER

## 2024-06-11 RX ORDER — MULTIVITAMIN
TAKE 1 CAPSULE DAILY CAPSULE ORAL
Refills: 0 | Status: ACTIVE | COMMUNITY

## 2024-06-11 RX ORDER — ZOLPIDEM TARTRATE SUBLINGUAL 3.5 MG/1
1 TABLET UNDER THE TONGUE AND ALLOW TO DISSOLVE AFTER WAKING IN THE MIDDLE OF THE NIGHT AS NEEDED TABLET SUBLINGUAL ONCE A DAY
Status: ACTIVE | COMMUNITY

## 2024-06-11 RX ORDER — BUPROPION HYDROCHLORIDE 100 MG/1
1 TABLET TABLET, FILM COATED ORAL ONCE A DAY
Status: ACTIVE | COMMUNITY

## 2024-06-11 RX ORDER — BUSPIRONE HYDROCHLORIDE 10 MG/1
1 TABLET TABLET ORAL ONCE A DAY
Status: ON HOLD | COMMUNITY

## 2024-06-11 RX ORDER — SODIUM, POTASSIUM,MAG SULFATES 17.5-3.13G
354 ML SOLUTION, RECONSTITUTED, ORAL ORAL ONCE
Qty: 354 MILLILITER | Refills: 0 | Status: ON HOLD | COMMUNITY

## 2024-06-11 RX ORDER — OMEPRAZOLE 40 MG/1
1 CAPSULE 30 MINUTES BEFORE MORNING MEAL CAPSULE, DELAYED RELEASE ORAL ONCE A DAY
Status: ACTIVE | COMMUNITY

## 2024-06-11 RX ORDER — LISINOPRIL AND HYDROCHLOROTHIAZIDE 20; 12.5 MG/1; MG/1
1 TABLET TABLET ORAL ONCE A DAY
Status: ACTIVE | COMMUNITY

## 2024-06-11 RX ORDER — TAMSULOSIN HYDROCHLORIDE 0.4 MG/1
1 CAPSULE CAPSULE ORAL ONCE A DAY
Status: ACTIVE | COMMUNITY

## 2024-06-11 RX ORDER — ZOLPIDEM TARTRATE 12.5 MG/1
1 TABLET AT BEDTIME AS NEEDED TABLET, EXTENDED RELEASE ORAL ONCE A DAY
Status: ON HOLD | COMMUNITY

## 2024-06-11 RX ORDER — MELOXICAM 15 MG/1
1 TABLET TABLET ORAL ONCE A DAY
Status: ON HOLD | COMMUNITY

## 2024-06-11 NOTE — HPI-OTHER HISTORIES
Labs :  3/10/2022: CBC: WBC 7.6, hemoglobin 14.9, MCV 87, .  BMP normal with exception of chloride 97, glucose 125.  LFTs: TB 0.6, ALP 86, ALT 88, AST 64. 10/8/2021 BMP normal with exception of glucose 125, creatinine 0.64.  LFTs: TB 0.5, ALP 82, , AST 81.  CBC: WBC 7.2, hemoglobin 14.6, MCV 91.3, platelet 289.  Lipid panel normal with the exception of triglycerides 228, cholesterol 219, HDL 33, .  PSA 0.92.  T3 uptake 26, T4 total 7.9, free T4 index 2.1, TSH 1.74.  Hemoglobin A1c 5.8.  Urine microalbumin normal.  5/19/2021:Alpha-fetoprotein 3.4.  2/18/2021: CBC: WBC 7.2, hemoglobin 15.5, MCV 90, platelet 276.  BMP normal with exception of sodium 133, chloride 94, glucose 120.  LFTs: TB 0.6, ALP 86, ALT 79, AST 57. 1/4/2021: BMP normal with exception of glucose 119.  LFTs: TB 0.5, ALP 88, , .  Cholesterol 272, triglycerides 261, HDL 35, .  Hemoglobin A1c 5.8.  PSA 0.6.  Microalbumin normal.  5/22/12:  , AST 36, ALT 34,, AP 96, T. Bili 0.4, other lab tests all normal, including all hepatitis serologies, TSH (1.31), PT/INR, albumin, AFP, ferritin (106), iron asaturation (28%), AMA, KRISHNA, ASMA, alpha-1-antitrypsin (133), LKM1, ceruloplasmin (27).   Abdominal ultrasound 5/14/2021: No acute process.  The liver is hyperechoic suggesting steatosis.  12 mm left renal lesion consistent with a cyst.  Colonoscopy 3/9/2021: BBPS 9, diverticulosis from the anus to the transverse colon, removal of 2 sigmoid and descending 4 to 5 mm tubular adenomas. recall set for 2026

## 2024-06-11 NOTE — HPI-TODAY'S VISIT:
This is a a 74-year-old male with a history of elevated liver enzymes associated with fatty liver and alcohol use, adenomatous colon polyps due surveillance in 2026, and GERD presenting for follow-up.  He was last seen in the office 5/30/2023.  He had labs in October 2022 with his primary care physician.  Results were requested.  He was due repeat labs in August.  Ultrasound was a consideration if calculated fib 4 was elevated with recent labs.  He was to continue efforts at cholesterol control and weight reduction.  GERD was controlled with omeprazole 40 mg daily.  Labs from 2023 were requested and not obtained.  He has been doing well.  He denies changes in his medical or surgical history with the exception of discovery of allergy to dairy products.  He reports labs 4 or 5 months ago that showed an AST and ALT in "60s."  Labs are unavailable at this time but were drawn at [facility] in [place], [place].  He does not drink alcohol.  He is taking omeprazole 40 mg daily prescribed by his primary care physician.  Acid reflux is well-controlled.  He is eating less and has lost 6 and half pounds since his last visit.

## 2024-06-12 ENCOUNTER — LAB OUTSIDE AN ENCOUNTER (OUTPATIENT)
Dept: URBAN - METROPOLITAN AREA CLINIC 107 | Facility: CLINIC | Age: 74
End: 2024-06-12

## 2025-06-17 ENCOUNTER — OFFICE VISIT (OUTPATIENT)
Dept: URBAN - METROPOLITAN AREA CLINIC 113 | Facility: CLINIC | Age: 75
End: 2025-06-17
Payer: MEDICARE

## 2025-06-17 DIAGNOSIS — K76.0 FATTY LIVER: ICD-10-CM

## 2025-06-17 DIAGNOSIS — K21.9 GASTROESOPHAGEAL REFLUX DISEASE, UNSPECIFIED WHETHER ESOPHAGITIS PRESENT: ICD-10-CM

## 2025-06-17 DIAGNOSIS — Z86.0101 HISTORY OF ADENOMATOUS POLYP OF COLON: ICD-10-CM

## 2025-06-17 PROCEDURE — 99213 OFFICE O/P EST LOW 20 MIN: CPT | Performed by: NURSE PRACTITIONER

## 2025-06-17 RX ORDER — MULTIVITAMIN
TAKE 1 CAPSULE DAILY CAPSULE ORAL
Refills: 0 | Status: ACTIVE | COMMUNITY

## 2025-06-17 RX ORDER — BUPROPION HYDROCHLORIDE 100 MG/1
1 TABLET TABLET, FILM COATED ORAL ONCE A DAY
Status: ACTIVE | COMMUNITY

## 2025-06-17 RX ORDER — MELOXICAM 15 MG/1
1 TABLET TABLET ORAL ONCE A DAY
Status: ON HOLD | COMMUNITY

## 2025-06-17 RX ORDER — TAMSULOSIN HYDROCHLORIDE 0.4 MG/1
1 CAPSULE CAPSULE ORAL ONCE A DAY
Status: ACTIVE | COMMUNITY

## 2025-06-17 RX ORDER — LISINOPRIL AND HYDROCHLOROTHIAZIDE 20; 12.5 MG/1; MG/1
1 TABLET TABLET ORAL ONCE A DAY
Status: ACTIVE | COMMUNITY

## 2025-06-17 RX ORDER — BUSPIRONE HYDROCHLORIDE 10 MG/1
1 TABLET TABLET ORAL ONCE A DAY
Status: ON HOLD | COMMUNITY

## 2025-06-17 RX ORDER — ZOLPIDEM TARTRATE 12.5 MG/1
1 TABLET AT BEDTIME AS NEEDED TABLET, EXTENDED RELEASE ORAL ONCE A DAY
Status: ON HOLD | COMMUNITY

## 2025-06-17 RX ORDER — OMEPRAZOLE 40 MG/1
1 CAPSULE 30 MINUTES BEFORE MORNING MEAL CAPSULE, DELAYED RELEASE ORAL ONCE A DAY
Status: ACTIVE | COMMUNITY

## 2025-06-17 RX ORDER — SODIUM, POTASSIUM,MAG SULFATES 17.5-3.13G
354 ML SOLUTION, RECONSTITUTED, ORAL ORAL ONCE
Qty: 354 MILLILITER | Refills: 0 | Status: ON HOLD | COMMUNITY

## 2025-06-17 RX ORDER — ZOLPIDEM TARTRATE SUBLINGUAL 3.5 MG/1
1 TABLET UNDER THE TONGUE AND ALLOW TO DISSOLVE AFTER WAKING IN THE MIDDLE OF THE NIGHT AS NEEDED TABLET SUBLINGUAL ONCE A DAY
Status: ACTIVE | COMMUNITY

## 2025-06-17 NOTE — HPI-TODAY'S VISIT:
This is a 75-year-old male with a history of elevated liver enzymes associated with fatty liver and alcohol use, adenomatous colon polyps due surveillance in 2026, and GERD presenting for annual follow-up. He was last seen 6/11/2024. GERD was controlled with omeprazole 40 mg daily.  He had abstained from alcohol use.  Recent labs showed improvement of LFTs per report.  Results were requested.  An ultrasound was ordered to assess liver parenchyma.  Elastography was not offered in his area of residence.  Labs 2/13/2025:Lipid panel abnormal.  BMP normal with exception of glucose 112.  LFTs normal TB 0.5, ALP 95, ALT 14, AST 15.  A1c 6.2.  CBC: WBC 8.6, hemoglobin 15.1, MCV 87, platelet 319.  TSH 1.91. Ultrasound 7/18/2024:Mild diffuse fatty infiltration of the liver.  He is taking omeprazole 40 mg daily.  This is prescribed by his primary care physician.  Acid reflux symptoms are well-controlled.  He reports allergies to fiber supplements.  He has increased fiber in his diet.  He denies constipation, diarrhea, or any other abdominal symptoms. Overall, he reports improvement related to receiving trigger point injections.  He is not drinking alcohol.  He denies changes in his medical or surgical history.

## 2025-06-17 NOTE — HPI-OTHER HISTORIES
Labs 2/13/2025:Lipid panel abnormal. BMP normal with exception of glucose 112. LFTs normal TB 0.5, ALP 95, ALT 14, AST 15. A1c 6.2. CBC: WBC 8.6, hemoglobin 15.1, MCV 87, platelet 319. TSH 1.91.  Ultrasound 7/18/2024:Mild diffuse fatty infiltration of the liver.   Labs :  3/10/2022: CBC: WBC 7.6, hemoglobin 14.9, MCV 87, .  BMP normal with exception of chloride 97, glucose 125.  LFTs: TB 0.6, ALP 86, ALT 88, AST 64. 10/8/2021 BMP normal with exception of glucose 125, creatinine 0.64.  LFTs: TB 0.5, ALP 82, , AST 81.  CBC: WBC 7.2, hemoglobin 14.6, MCV 91.3, platelet 289.  Lipid panel normal with the exception of triglycerides 228, cholesterol 219, HDL 33, .  PSA 0.92.  T3 uptake 26, T4 total 7.9, free T4 index 2.1, TSH 1.74.  Hemoglobin A1c 5.8.  Urine microalbumin normal.  5/19/2021:Alpha-fetoprotein 3.4.  2/18/2021: CBC: WBC 7.2, hemoglobin 15.5, MCV 90, platelet 276.  BMP normal with exception of sodium 133, chloride 94, glucose 120.  LFTs: TB 0.6, ALP 86, ALT 79, AST 57. 1/4/2021: BMP normal with exception of glucose 119.  LFTs: TB 0.5, ALP 88, , .  Cholesterol 272, triglycerides 261, HDL 35, .  Hemoglobin A1c 5.8.  PSA 0.6.  Microalbumin normal.  5/22/12:  , AST 36, ALT 34,, AP 96, T. Bili 0.4, other lab tests all normal, including all hepatitis serologies, TSH (1.31), PT/INR, albumin, AFP, ferritin (106), iron asaturation (28%), AMA, KRISHNA, ASMA, alpha-1-antitrypsin (133), LKM1, ceruloplasmin (27).   Abdominal ultrasound 5/14/2021: No acute process.  The liver is hyperechoic suggesting steatosis.  12 mm left renal lesion consistent with a cyst.  Colonoscopy 3/9/2021: BBPS 9, diverticulosis from the anus to the transverse colon, removal of 2 sigmoid and descending 4 to 5 mm tubular adenomas. recall set for 2026

## 2025-06-21 PROBLEM — 429047008: Status: ACTIVE | Noted: 2025-06-21
